# Patient Record
Sex: MALE | Race: WHITE | Employment: UNEMPLOYED | URBAN - METROPOLITAN AREA
[De-identification: names, ages, dates, MRNs, and addresses within clinical notes are randomized per-mention and may not be internally consistent; named-entity substitution may affect disease eponyms.]

---

## 2019-06-04 ENCOUNTER — TELEPHONE (OUTPATIENT)
Dept: SURGERY | Age: 49
End: 2019-06-04

## 2019-06-04 NOTE — TELEPHONE ENCOUNTER
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 I recommended repeat EGD in 3 years 7/2019.   Schedule pt to RTC for scheduling for follow up EGD

## 2019-06-04 NOTE — TELEPHONE ENCOUNTER
MA called pt on all numbers listed in chart, pt's phone numbers all come up unavailable. MA to attempt contact again. Will mail letter to pt as well.   Electronically signed by Dina Apgar on 19 at 9:01 AM